# Patient Record
Sex: FEMALE | Race: WHITE
[De-identification: names, ages, dates, MRNs, and addresses within clinical notes are randomized per-mention and may not be internally consistent; named-entity substitution may affect disease eponyms.]

---

## 2019-09-22 ENCOUNTER — HOSPITAL ENCOUNTER (INPATIENT)
Dept: HOSPITAL 41 - JD.OBCHECK | Age: 34
LOS: 3 days | Discharge: HOME | DRG: 540 | End: 2019-09-25
Attending: OBSTETRICS & GYNECOLOGY | Admitting: OBSTETRICS & GYNECOLOGY
Payer: COMMERCIAL

## 2019-09-22 DIAGNOSIS — E66.9: ICD-10-CM

## 2019-09-22 DIAGNOSIS — Z79.899: ICD-10-CM

## 2019-09-22 DIAGNOSIS — Z88.0: ICD-10-CM

## 2019-09-22 DIAGNOSIS — Z3A.39: ICD-10-CM

## 2019-09-22 DIAGNOSIS — Z88.1: ICD-10-CM

## 2019-09-22 DIAGNOSIS — Z88.2: ICD-10-CM

## 2019-09-22 DIAGNOSIS — O34.211: ICD-10-CM

## 2019-09-22 PROCEDURE — 10907ZC DRAINAGE OF AMNIOTIC FLUID, THERAPEUTIC FROM PRODUCTS OF CONCEPTION, VIA NATURAL OR ARTIFICIAL OPENING: ICD-10-PCS | Performed by: OBSTETRICS & GYNECOLOGY

## 2019-09-22 NOTE — PCM.POSTAN
POST ANESTHESIA ASSESSMENT





- MENTAL STATUS


Mental Status: Alert, Oriented





- VITAL SIGNS


Vital Signs: 


 Last Vital Signs











2335


93/67


100


17


98%


97.9F


  


 


  


 


  


 


  


 


  














- RESPIRATORY


Respiratory Status: Respiratory Rate WNL, Airway Patent, O2 Saturation Stable, 

Supplemental Oxygen





- CARDIOVASCULAR


CV Status: Pulse Rate WNL, Blood Pressure Stable





- GASTROINTESTINAL


GI Status: No Symptoms





- PAIN


Pain Score: 0





- POST OP HYDRATION


Hydration Status: Adequate & Stable

## 2019-09-22 NOTE — PCM.PREANE
Preanesthetic Assessment





- Anesthesia/Transfusion/Family Hx


Anesthesia History: Prior Anesthesia Without Reaction


Family History of Anesthesia Reaction: No


Transfusion History: No Prior Transfusion(s)





- Review of Systems


General: No Symptoms


Pulmonary: No Symptoms (History of Asthma, does not use inhaler for control. )


Cardiovascular: No Symptoms


Gastrointestinal: No Symptoms


Neurological: No Symptoms


Other: Reports: Depression, Anxiety





- Physical Assessment


NPO Status Date: 19


NPO Status Time: 15:00


Vital Signs: 





 Last Vital Signs











Temp  36.6 C   19 20:10


 


Pulse  80   19 20:10


 


Resp  16   19 20:10


 


BP  133/85   19 20:10


 


Pulse Ox  99   19 20:10











Height: 1.55 m


Weight: 112.037 kg


ASA Class: 2E


Mental Status: Alert & Oriented x3


Airway Class: Mallampati = 2


Dentition: Reports: Normal Dentition


Thyro-Mental Finger Breadths: 3


Mouth Opening Finger Breadths: 3


ROM/Head Extension: Full


Lungs: Clear to Auscultation, Normal Respiratory Effort


Cardiovascular: Regular Rate, Regular Rhythm





- Lab


Values: 





 Laboratory Last Values











WBC  9.61 K/mm3 (3.98-10.04)   19  21:20    


 


RBC  4.40 M/mm3 (3.98-5.22)   19  21:20    


 


Hgb  13.2 gm/dl (11.2-15.7)   19  21:20    


 


Hct  39.9 % (34.1-44.9)   19  21:20    


 


MCV  90.7 fl (79.4-94.8)   19  21:20    


 


MCH  30.0 pg (25.6-32.2)   19  21:20    


 


MCHC  33.1 g/dl (32.2-35.5)   19  21:20    


 


RDW Std Deviation  45.7 fL (36.4-46.3)   19  21:20    


 


Plt Count  158 K/mm3 (182-369)  L  19  21:20    


 


MPV  10.8 fl (9.4-12.3)   19  21:20    


 


Neut % (Auto)  74.0 % (34.0-71.1)  H  19  21:20    


 


Lymph % (Auto)  16.5 % (19.3-51.7)  L  19  21:20    


 


Mono % (Auto)  8.3 % (4.7-12.5)   19  21:20    


 


Eos % (Auto)  0.4  (0.7-5.8)  L  19  21:20    


 


Baso % (Auto)  0.2 % (0.1-1.2)   19  21:20    


 


Neut # (Auto)  7.10 K/mm3 (1.56-6.13)  H  19  21:20    


 


Lymph # (Auto)  1.59 K/mm3 (1.18-3.74)   19  21:20    


 


Mono # (Auto)  0.80 K/mm3 (0.24-0.36)  H  19  21:20    


 


Eos # (Auto)  0.04 K/mm3 (0.04-0.36)   19  21:20    


 


Baso # (Auto)  0.02 K/mm3 (0.01-0.08)   19  21:20    


 


RPR  Non-reactive  (NONREACTIVE)   19  21:20    


 


Blood Type  O POSITIVE   19  21:20    


 


Gel Antibody Screen  Negative   19  21:20    














- Allergies


Allergies/Adverse Reactions: 


 Allergies











Allergy/AdvReac Type Severity Reaction Status Date / Time


 


Penicillins Allergy  Hives Verified 16 23:30


 


Sulfa (Sulfonamide Allergy  Hives Verified 16 23:30





Antibiotics)     


 


vancomycin Allergy  Itching Verified 16 03:45














- Acknowledgements


Anesthesia Type Planned: Spinal


Pt an Appropriate Candidate for the Planned Anesthesia: Yes


Alternatives and Risks of Anesthesia Discussed w Pt/Guardian: Yes


Pt/Guardian Understands and Agrees with Anesthesia Plan: Yes





PreAnesthesia Questionnaire


Respiratory History: Reports: Asthma


OB/GYN History: Reports: Pregnancy


Other OB/BYN History: Previous  


Endocrine/Metabolic History: Reports: Obesity/BMI 30+


Other Endocrine/Metabolic History: has had thyroid issues since after last 

pregnancy, but her levels have been fine with this pregnancy





- Past Surgical History


HEENT Surgical History: Reports: Oral Surgery, Tonsillectomy, Other (See Below)


Female  Surgical History: Reports:  Section





- HOME MEDS


Home Medications: 


 Home Meds





Acetaminophen [Tylenol] 650 mg PO Q6H PRN #0 tablet 16 [Rx]


Benzocaine/Menthol [Dermoplast Pain Relief Spray] 1 spray TOP ASDIRECTED PRN #0 

canister 16 [Rx]


Docusate Sodium [Colace] 100 mg PO BID PRN #0 cap 16 [Rx]


Ibuprofen [IJD: Ibuprofen] 200 - 600 mg PO Q6H PRN #0 tablet 16 [Rx]


Prenatal Vit with Ca/FA/Iron [Prenatal Plus Iron] 1 each PO DAILY  tablet  [Rx]


Witch Hazel [Tucks] 1 pad TOP ASDIRECTED PRN #0 pad 16 [Rx]











- CURRENT (IN HOUSE) MEDS


Current Meds: 





 Current Medications





Diphenhydramine HCl (Benadryl)  25 mg IVPUSH Q6H PRN


   PRN Reason: Pruritis


Ephedrine Sulfate (Ephedrine Sulfate)  5 mg IVPUSH ASDIRECTED PRN


   PRN Reason: Hypotension


Fentanyl (Sublimaze)  50 mcg IVPUSH Q5M PRN


   PRN Reason: Pain


Lactated Ringer's (Ringers, Lactated)  1,000 mls @ 100 mls/hr IV ASDIRECTED [facility]


   Last Admin: 19 22:17 Dose:  100 mls/hr


Clindamycin Phosphate 900 mg/ (Sodium Chloride)  106 mls @ 100 mls/hr IV Q8H [facility]


   Last Admin: 19 21:35 Dose:  100 mls/hr


Oxytocin/Lactated Ringer's (Pitocin In Lr 10 Units/1,000 Ml)  10 unit in 1,000 

mls @ 500 mls/hr IV .CONTINUOUS [facility]


Oxytocin/Lactated Ringer's (Pitocin In Lr 10 Units/1,000 Ml)  10 unit in 1,000 

mls @ 100 mls/hr IV ASDIRECTED [facility]


Nalbuphine HCl (Nubain)  10 mg IVPUSH Q2H PRN


   PRN Reason: Pain


Ondansetron HCl (Zofran)  4 mg IVPUSH Q4H PRN


   PRN Reason: Nausea/Vomiting


Ondansetron HCl (Zofran)  4 mg IVPUSH ONETIME PRN


   PRN Reason: Nausea/Vomiting


Sodium Chloride (Saline Flush)  10 ml FLUSH ASDIRECTED PRN


   PRN Reason: Keep Vein Open





Discontinued Medications





Bupivacaine HCl (Marcaine 0.5%) Confirm Administered Dose 30 ml .ROUTE .STK-MED 

ONE


   Stop: 19 22:32


Cefazolin Sodium (Ancef) Confirm Administered Dose 2 gm .ROUTE .STK-MED ONE


   Stop: 19 22:29


Citric Acid/Sodium Citrate (Bicitra Solution)  30 ml PO ONETIME ONE


   Stop: 19 22:26


Sodium Chloride (Normal Saline)  1,000 mls @ 999 mls/hr IV ONETIME ONE


   Stop: 19 22:21


   Last Admin: 19 21:30 Dose:  999 mls/hr


Cefazolin Sodium/Dextrose 2 gm (/ Premix)  50 mls @ 100 mls/hr IV ONETIME ONE


   Stop: 19 22:54


Lactated Ringer's (Ringers, Lactated) Confirm Administered Dose 2,000 mls @ as 

directed .ROUTE .STK-MED ONE


   Stop: 19 22:29


Ketorolac Tromethamine (Toradol) Confirm Administered Dose 30 mg .ROUTE .STK-

MED ONE


   Stop: 19 22:29


Metoclopramide HCl (Reglan)  10 mg IVPUSH ONETIME ONE


   Stop: 19 22:26


Morphine Sulfate (Duramorph Pf) Confirm Administered Dose 10 mg .ROUTE .STK-MED 

ONE


   Stop: 19 22:29


Ondansetron HCl (Zofran) Confirm Administered Dose 4 mg .ROUTE .STK-MED ONE


   Stop: 19 22:29


Oxytocin (Pitocin) Confirm Administered Dose 20 unit .ROUTE .STK-MED ONE


   Stop: 19 22:29


Phenylephrine HCl (Phenylephrine In Ns 100 Mcg/Ml) Confirm Administered Dose 1 

mg .ROUTE .STK-MED ONE


   Stop: 19 23:18

## 2019-09-22 NOTE — PCM.SN
- Free Text/Narrative


Note: 





addendum to previously dictated history and physical.





Esperanza is a 34-year-old  3 para 1102 white female who is admitted on the 

evening of 2019 at 39-5/7 weeks gestational age with an JOSE of 2019. 

Her chief concern at this time is decreased fetal activity. She has a history 

of previous  section and would like to do a trial of labor after 

 section to effect a vaginal birth after  section. She has 

successful VBACed with her most recent pregnancy. The patient noted decreased 

fetal activity today and came in for that reason alone. She has occasional 

contractions but they're not strong. She denies any leakage of fluid, bleeding 

or other signs of labor.





Fetal heart tones initially are in the 160s and 170s with decreased 

variability. Decision was made to admit the patient and to proceed with labor. 

Artificial rupture is undertaken with resultant meconium-stained amniotic 

fluid. Her cervix is 3 cm dilated, 80% effaced, very soft, midposition and at a 

-3 station, cephalic presentation. Attempt was made to place a scalp electrode 

but this is on successful.





Remainder of the history and physical is essentiallyunchanged from that 

dictated previously and in this chart.





Assessment:





1. Term intrauterine pregnancy at 39-3/7 weeks gestational age admitted for 

reported decreased fetal activity. have contractions every 3-5 minutes apart, 

mild lens intensity. Cervix is changed to 3 cm and 80% effaced. Fetal heart 

tones are with decreased variability and are in the 160s 70s range. skin great 

2 fetal heart rate tracing will be monitored closely with intervention if 

appropriate.  attempt is discussed in detail with patient including 

procedure, risks, benefits, efforts to reduce potential risk. she appears to 

understand, wishes to proceed.





2. risk factors for the pregnancy include increased weight, distance from the 

hospital and history of previous  section. para 3. blood is o+.





4. rubella titer shows immunity





5. patient plans to breast-feed.





plan:





1. onitor contractions and fetal heart tones very closely.





2. pediatrician be in attendance at time of delivery because of meconium-

stained amniotic fluid





3. repeat  section if fetal heart tones indicate baby will not 

withstand labor necessary to effect delivery.





4. consents for trial of labor after  section for vaginal birth after 

 section processes (TOLAC for ) and for repeat  section





5. we'll obtain preoperative  labs





6. near continuous fetal heart rate and contraction monitoring monitoring





7. will alert anesthesia that patient is in-house.





8. support breast-feeding intention





9. routine labor care

## 2019-09-22 NOTE — PCM.SN
- Free Text/Narrative


Note: 





patient has received 1 L of normal saline to rehydrate her and see whether a 

change in fetal heart tones noted. She continues to have a fetal heart rate of 

165-170 with minimal variability. Occasional very mild late decelerations are 

noted. Patient's cervix is 3 cm, 80% effaced, -3 station, mid position, very 

soft, cephalic presentation. Considering the amount of labor necessary to  

achieve vaginal delivery presence of meconium-stained amniotic fluid,  fetal 

heart tones as described in a patient who has had a previous  section 

feel that  is warranted. The procedure, risks, benefits, follow-up 

were discussed in detail with patient. She appears to understand and wishes to 

proceed. She has signed a consent.  We'll proceed to  section. 

pediatrician will be in attendance.

## 2019-09-22 NOTE — PCM.OPNOTE
- General Post-Op/Procedure Note


Date of Surgery/Procedure: 19


Operative Procedure(s): repeat lower uterine segment transverse  

section through Jerman skin incision under spinal block


Findings: 





normaluterus, tubes and ovaries consistent with term pregnancy. Baby in vertex 

presentation. Meconium-stained amniotic fluid. Moderatedelivery scarring in the 

anterior abdominal wall.


Pre Op Diagnosis: 1. 39-3/7 week intrauterine pregnancy.  2. Nonreassuring 

fetal heart tones.  3. Meconium-stained amniotic fluid.  4. previous  

affecting pregnancy


Post-Op Diagnosis: Same with nuchal cord 2 and a true knot in the umbilical 

cord.  male infant,  3520 g (7 lbs. 12 oz.) Apgars of  0 and 7 at one and 5 

minutes post delivery..Delivery time 3303 hours on 2019


Anesthesia Technique: Spinal


Other Anesthesia Type: Marcaine 0.5%10 mL  local


Primary Surgeon: Adama Reza


Secondary Surgeon: Edgarod Lynch


Anesthesia Provider: Kenyatta Anthony


Reason Assistant Was Necessary: 





Retraction, assistance, patient safety, quality of care.


Fluid Replacement, Intraop: 2,000


Output, Urine Amount: 100


EBL in mLs: 500


Complications: None


Condition: Good


Free Text/Narrative:: 





surgery duration: 51 minutes





Procedure:  The patient is appropriately consented. Patient was transferred to 

the  room and placed in a sitting position. Spinal anesthesia was 

administered. After confirmation of adequate anesthesia patient was placed in a 

supine position with a wedge under her right side to facilitate left lateral 

positioning. The patient was prepped and draped in usual fashion after Aguilar 

catheter was placed . The anesthetic was checked and found to be adequate. 20 

mL of Marcaine 0.5% was injected locally in the Pfannenstiel incision site. The 

Pfannenstiel skin incision was then made and carried down through skin, 

subcutaneous and fascial layers. The fascia was then undermined superiorly and 

inferiorly to allow for adequate operating room. The recti muscles  

midline and preperitoneal fat was bluntly dissected. Peritoneal cavity was 

entered longitudinally. The vesicouterine peritoneum was then incised 

transversely and bladder flap was developed. Myometrium was incised 

transversely to the level of the amniotic sac. This incision was extended 

bilaterally in a blunt fashion. The amniotic sac was then ruptured resulting in 

meconium-stained amniotic fluid. A hand is placed in the low uterine segment 

and the baby's head was brought forth through the incision. The baby was 

completely delivered using fundal pressure in a routine fashion. The nose and 

mouth were bulb suctioned. Baby's cord was clamped x2 cut and baby was handed 

off to attending pediatrician Dr owusu. Placenta was expressed after cord blood 

was obtained. Uterus was then exteriorized to allow for easier closure. The 

cervix was assessed and found to be dilated adequately to allow egress of 

blood. The uterus was closed in 2 layers. The first layer a running locked 

suture of 0 Monocryl, the second layer a running locked vertical mattress 

suture of 0 Monocryl. Figure-of-eight suture was placed at mid incision to 

control 1 bleeder. Hemostasis confirmed at this time. Sponge instrument needle 

counts are correct. The uterus was returned to the abdominal cavity and lateral 

gutters were cleared of blood. Once again sponge needle counts are correct. The 

anterior abdominal wall was closed with a #1 PDS suture from angle to angle. 

The subcutaneous area was found to be free of any bleeders. interrupted sutures 

of 3-0 Monocryl were used to reapproximate the subcutaneous layer.Skin was 

closed with a running subcuticular stitch of 3-0 Monocryl in a vertical 

mattress suture fashion using a Carrillo needle. Prineo mesh/glue was then applied 

to further approximate the incision.





It should be noted that patient received 2 g of Ancef preoperatively for 

infection prophylaxis and had Pitocin infused after delivery of the placenta to 

facilitate uterine contraction. She also had sequential compression stockings 

in place for DVT prophylaxis. Patient was discharged from the operating room in 

satisfactory condition.

## 2019-09-22 NOTE — PCM.SN
- Free Text/Narrative


Note: 





Admission H and P:








19 10:58


Esperanza is a 34-year-old  3 para / white female who is scheduled for 

admission on 2019 for elective induction of labor at 39-5/7 weeks 

gestational age with an JOSE of 2019. Patient has had a previous  

section but has successfully VBACed with her most recent pregnancy.


Source of Information: Patient


History Limitations: Reports: No Limitations





- History of Present Illness


Introduction:: 





Esperanza is a 34-year-old  3 para / white female who is scheduled for 

admission on 2019 for elective induction of labor at 39-5/7 weeks 

gestational age with an JOSE of 2019. Patient has had a previous  

section but has successfully VBACed with her most recent pregnancy.Procedure of 

trial labor after  section, its risks, benefits and efforts intended to 

reduce risk at the time of the labor are discussed in detail. She appears to 

understand, wishes to proceed. Upon admission patient will be started on 

induction protocol, consents will be obtained for trial labor after  

section and attempt at vaginal birth after  section,  labs 

will be obtained, fetal heart tones will be near continuously monitored, 

anesthesia and surgery departments will be made aware of her presence in labor 

and delivery.





OB/GYN history: Esperanza is a  3 para 1102 with an JOSE of 2019 as 

determined by certain last menstrual period starting 2018 and supported 

by 2 ultrasounds done on 3/14/2019 and 2019. Patient's prenatal course has 

been relatively unremarkable. Her first prenatal visit was on 3/14/2019. She's 

been seen on a regular basis. Her weight loss has been from 265 pounds to 248 

pounds.. Patient attributes this to eating properly. Her fundal height growth 

has been appropriate. Patient is group B strep positive but her group B strep 

has been evaluated for sensitivity to clindamycin which has been confirmed. 

Patient has had some anxiety during the pregnancy. She has a history of asthma. 

Depression. She desires epidural in labor. Her [place] postpartum depression 

screening score on 2019 was 3/30. She lives in [facility]. He plans 

to  throughout the whole pregnancy. She plans to breast-feed. Quad screen 

was negative. Patient did have a shingles outbreak during the course of the 

pregnancy. Risk factors include the following: Distance from the hospital, 

history of  section and increased weight.


Past obstetric history includes the followin. Primary  section 2012 at 36 weeks gestational age after 19 

hours of labor. 6 lbs. 9 oz. female born in [facility]. Patient was 

noted to have fetal distress child's name is Yasmany





2. Vaginal birth after  section on 201638-6/7 week intrauterine 

[id number] hours of labor7 lbs. 13 oz.[place]





Prenatal laboratory testing shows blood to be O+ with a negative and by screen. 

First prenatal hemoglobin was 13.1 g/dL. Platelets are 191,000. Rubella titer 

showed immunity. RPR is nonreactive. FSB surface antigen and HIV assays were 

both negative as were Chlamydia and gonorrhea assays. Second trimester labs 

showed hemoglobin 12.4 g/dL. Platelets are 165,000. Her diabetic screening test 

was elevated at 157. Her three-hour glucose tolerance test was normal with 

fasting blood sugar of 89 a 1 hour glucose of 165. 2R glucose was 154 and 3 

hour glucose was 81. Third trimester platelet count was 152,000 on 2019. 

Her hemoglobin was 12.8 at that time. Group B strep screen was positive. 

Patient has an allergy to ampicillin penicillin however group B strep was found 

to be sensitive to clindamycin and vancomycin.





Allergies:





1. Penicillins which cause hives





2. Sulfur drugs which cause hives 





3. Vancomycin which causes itching.





Past medical history:





1. Vaginal delivery 1





2. Anxiety/depression. Has been on medication postpartum for this





3. Obesity





4. Acne





5. History of abnormal Pap smears in 





Past surgical history:





1.  section 2012 for fetal distress.





Family history: Father of the baby and his sister have factor V Leiden 

mutation. Patient's mother and father are alive and well. Mother has 

hypertension. Father with posttraumatic stress disorder. Patient has half-

sisters and half brothers who are alive and well. Maternal grandmother is 

 secondary to liver disorder. Maternal grandfather  secondary 

to prostate cancer. Paternal grandmother  secondary some type of 

cancer. Paternal grandfather with history of Alzheimer's-. No bleeding, 

blood clotting, anesthesia or pregnancy problems noted in the family.





Social history: Patient is . She lives in [facility]. She is a 

dental assistant. She is a college graduate. She does not use any significant 

muscle L call, drugs or tobacco. Her 's name is Dominick Avina.





Review of systems: In general patient has no complaints. Babies have been 

active. No contractions or other concerns.





Skin: Negative





Lungs: No infectious symptoms or shortness of breath





Cardiovascular: No chest pain or exercise intolerance





Breasts: No lumps, changes in size, pain, dimpling, discharge or axillary or 

supraclavicular concerns.





GI: Negative





: Negative





Musculoskeletal: Negative





Neurological: Negative





In general the patient is well-developed, well-nourished, pleasant female of 

stated age in no acute distress.





Patient's pregravid weight was 263 pounds. Weight on last evaluation clinic  was 248.8 pounds. Height is 5 feet 2. On last evaluation clinic was 118/

82.





Skin is warm dry without lesions.





HEENT, neck and back within normal limits.





Lungs are clear with good breath sounds in all lung fields.





Cardiovascular exam shows regular and rhythm without murmurs.





Breasts exam done at first prenatal visit was found to be normal. Is not 

repeated at this time. Patient plans to breast-feed.





Abdomen is gravid with last fundal height of 40 cm. Baby in vertex presentation 





Genital digital exam shows cervix to be 2 cm, 30% effaced, very soft, mid 

position, -3 station. 





Extremities and neurological exam are grossly within normal limits.  





- Related Data


Allergies/Adverse Reactions: 


 Allergies











Allergy/AdvReac Type Severity Reaction Status Date / Time


 


Penicillins Allergy  Hives Verified 16 23:30


 


Sulfa (Sulfonamide Allergy  Hives Verified 16 23:30





Antibiotics)     


 


vancomycin Allergy  Itching Verified 16 03:45











Home Medications: 


 Home Meds





Acetaminophen [Tylenol] 650 mg PO Q6H PRN #0 tablet 16 [Rx]


Benzocaine/Menthol [Dermoplast Pain Relief Spray] 1 spray TOP ASDIRECTED PRN #0 

canister 16 [Rx]


Docusate Sodium [Colace] 100 mg PO BID PRN #0 cap 16 [Rx]


Ibuprofen [IJD: Ibuprofen] 200 - 600 mg PO Q6H PRN #0 tablet 16 [Rx]


Prenatal Vit with Ca/FA/Iron [Prenatal Plus Iron] 1 each PO DAILY  tablet  [Rx]


Witch Hazel [Tucks] 1 pad TOP ASDIRECTED PRN #0 pad 16 [Rx]











Past Medical History


Respiratory History: Reports: Asthma


OB/GYN History: Reports: Pregnancy


Other OB/BYN History: Previous  


Endocrine/Metabolic History: Reports: Obesity/BMI 30+


Other Endocrine/Metabolic History: has had thyroid issues since after last 

pregnancy, but her levels have been fine with this pregnancy





- Past Surgical History


HEENT Surgical History: Reports: Oral Surgery, Tonsillectomy, Other (See Below)


Female  Surgical History: Reports:  Section





Social & Family History





- Family History


Family Medical History: Noncontributory





H&P Review of Systems





- Review of Systems:


Review Of Systems: See Below





L&D Exam





- Exam


Exam: See Below


Problem List Initiated/Reviewed/Updated: Yes


Assessment/Plan Comment:: 





1. Term intrauterine pregnancy at 39-5/7 weeks gestational age admitted for 

elective induction of labor for trial labor after  section for attempt 

at vaginal birth after  section. Procedure, risks, benefits, efforts to 

reduce potential risk of a  are all discussed with the patient. She appears 

to understand, wishes to proceed.





2. Risk factors for the pregnancy include the following: Increased distance 

from the hospital, increased, history of previous  section.





3. Blood is O+.





4. Rubella titer shows immunity





5. Patient plans to breast-feed





Plan:





1.Induction of labor with Pitocin/artificial rupture of membranes





2. Obtain  labs upon admission along with an RPR.





3. Near continuous fetal heart rate monitoring





4. IV access





5. Alert surgery and anesthesia department's that patient is labor





6. Patient signed consent for a trial of labor after  section for 

attempt at vaginal birth after  section





7. Routine labor care.





8. Support breast-feeding intention.

## 2019-09-23 RX ADMIN — VITAMIN A, ASCORBIC ACID, CHOLECALCIFEROL, .ALPHA.-TOCOPHEROL ACETATE, DL-, THIAMINE MONONITRATE, RIBOFLAVIN, NIACINAMIDE, PYRIDOXINE HYDROCHLORIDE, FOLIC ACID, CYANOCOBALAMIN, CALCIUM CARBONATE, IRON, ZINC OXIDE, AND CUPRIC OXIDE SCH EACH: 4000; 120; 400; 22; 1.84; 3; 20; 10; 1; 12; 200; 29; 25; 2 TABLET ORAL at 10:07

## 2019-09-24 RX ADMIN — VITAMIN A, ASCORBIC ACID, CHOLECALCIFEROL, .ALPHA.-TOCOPHEROL ACETATE, DL-, THIAMINE MONONITRATE, RIBOFLAVIN, NIACINAMIDE, PYRIDOXINE HYDROCHLORIDE, FOLIC ACID, CYANOCOBALAMIN, CALCIUM CARBONATE, IRON, ZINC OXIDE, AND CUPRIC OXIDE SCH EACH: 4000; 120; 400; 22; 1.84; 3; 20; 10; 1; 12; 200; 29; 25; 2 TABLET ORAL at 09:50

## 2019-09-24 NOTE — PCM.SN
- Free Text/Narrative


Note: 





Postoperative day #2:








Patient is doing well in the postpartum period. Minimal lochia, voiding well, 

ambulated without problems. Nursing without concerns.





Patient is afebrile, vital signs are stable





Abdomen is flat, soft, uterus is below the umbilicus and is firm and nontender. 

Incision is dry and intact, mesh is in place. Sutures still intact





Legs are nontender.





Assessment: Postop day #2?proximally 36 hours post surgery-recovery going well.





Plan: Routine postpartum care. Patient be discharged home within the next 24-48 

hours.

## 2019-09-25 VITALS — HEART RATE: 84 BPM | DIASTOLIC BLOOD PRESSURE: 66 MMHG | SYSTOLIC BLOOD PRESSURE: 119 MMHG

## 2019-09-25 NOTE — [id number]
**Discharge Summary





- Hospital Course


Free Text/Narrative:: 








Esperanza is a 34-year-old  3 now para 3003 white female was admitted on  with reports of decreased fetal activity. Should she was noted to have 

fetal heart tones running in the 160s to 170s range with decreased variability. 

Attempts were made to hydrate the patient with IV fluids, repositioned the 

patient but fetal heart tones did not improve and decision was made to take the 

patient for a repeat  section. She had previous  section with 

her first pregnancy but had successfully  with her second pregnancy. She 

was hoping for a  with this delivery also. Patient was taken to surgery and 

a viable, jackson, male infant with weight of 3520 g (7 lbs. 12 oz.) was 

delivered. Apgars were 0 and 7 at one and 5 minutes respectively. Delivery time 

was 3303 hours  on 2019. Baby was noted to have nuchal cord 2 which was 

moderately tight and H were not in the cord. Amniotic fluid was moderately 

meconium stained.





Postpartum patient is done well. She is recovering nicely. She is made good 

bowel, ladder and ambulatory recovery. She is nursing. Baby was on IV fluids 

for short period time and is now doing well. Desiring discharge home. Patient 

is well-appearing well. Vital signs are stable and she's been afebrile. Her CBC 

after surgery was within reasonable limits considering pregnancy and surgical 

procedure of  section.





Diagnosis: Stroke: No





- Discharge Data


Discharge Date: 19


Discharge Disposition: Home, Self-Care 01


Condition: Good





- Referral to Home Health


Primary Care Physician: 


Adama Reza MD








- Patient Summary/Data


Operative Procedure(s) Performed: repeat lower uterine segment transverse 

 section through Jerman skin incision under spinal block





- Patient Instructions


Diet: Regular Diet as Tolerated (Nursing diet with increase calories and 

calcium is recommended)


Activity: As Tolerated (No intercourse or tampons until bleeding resolves. No 

lifting greater than 15 pounds or driving a car 1 week.)


Driving: Do Not Drive


Showering/Bathing: May Shower


Wound/Incision Care: Keep Operative Site/Wound Site Clean and Dry


Notify Provider of: Fever, Increased Pain, Swelling and Redness, Drainage, 

Nausea and/or Vomiting





- Discharge Plan


Home Medications: 


 Home Meds





Loratadine/Pseudoephedrine [Claritin-D 24 Hour Tablet] 1 tab PO DAILY 19 [

History]


Montelukast [Singulair] 10 mg PO DAILY 19 [History]


PNV95/Ferrous Fumarate/FA [Prenatal Vitamin Tablet] 1 tab PO DAILY 19 [

History]


Acetaminophen [Tylenol] 650 mg PO Q6HR PRN  tablet 19 [Rx]


Ibuprofen [Motrin] 600 mg PO Q6H PRN  tablet 19 [Rx]








Referrals: 


Adama Reza MD [Primary Care Provider] -  (Return to [facility] 

weeks.)





- Discharge Summary/Plan Comment


DC Time >30 min.: No


Discharge Summary/Plan Comment: 








Discharge instructions:





1. Discharge home





2. Diet, activity and follow-up discussed with patient. Recommend nursing diet 

with increased calories and calcium.





3. Precautions given concern increased pain, bleeding, temperature, signs/

symptoms of DVT/PE.





4. Medications per home medication was printed, discussed with and given to the 

patient.





5. Return to clinic-Dr. Reza[facility] in 2 weeks.





Diagnosis: 





1. Term pregnancy-delivered by repeat  section





Condition: Good





- Patient Data


Vitals - Most Recent: 


 Last Vital Signs











Temp  36.7 C   19 02:46


 


Pulse  72   19 02:46


 


Resp  14   19 02:46


 


BP  128/87   19 02:46


 


Pulse Ox  98   19 02:46











Weight - Most Recent: 112.037 kg


I&O - Last 24 hours: 


 Intake & Output











 19





 14:59 22:59 06:59


 


Intake Total 420 0 


 


Balance 420 0 











Med Orders - Current: 


 Current Medications





Acetaminophen (Tylenol)  650 mg PO Q6HR PRN


   PRN Reason: Pain (moderate 4-6)


   Last Admin: 19 02:43 Dose:  650 mg


Diphenhydramine HCl (Benadryl)  25 mg IVPUSH Q6H PRN


   PRN Reason: Itching or Nausea


Docusate Sodium (Colace)  100 mg PO Q12H PRN


   PRN Reason: Constipation


   Last Admin: 19 05:13 Dose:  100 mg


Emollient Ointment (Lansinoh Hpa)  0 gm TOP ASDIRECTED PRN


   PRN Reason: Sore Nipples


Ephedrine Sulfate (Ephedrine Sulfate)  5 mg IVPUSH SEECOMMENT PRN


   PRN Reason: Other


Ibuprofen (Motrin)  600 mg PO Q6H PRN


   PRN Reason: Pain (moderate 4-6)


   Last Admin: 19 05:13 Dose:  600 mg


Montelukast Sodium (Singulair)  10 mg PO BEDTIME [facility]


   Last Admin: 19 23:14 Dose:  Not Given


Naloxone HCl (Narcan)  0.1 mg IVPUSH SEECOMMENT PRN


   PRN Reason: Respiratory Depression


Ondansetron HCl (Zofran)  4 mg IV Q4H PRN


   PRN Reason: Nausea/Vomiting


   Last Admin: 19 06:46 Dose:  4 mg


Oxycodone/Acetaminophen (Percocet 325-5 Mg)  2 tab PO Q4H PRN


   PRN Reason: Pain (moderate 4-6)


Prenat Multivit/Marshall/Iron/Folic Ac (Prenatal Plus Iron)  1 each PO DAILY [facility]


   Last Admin: 19 09:50 Dose:  1 each


Simethicone (Simethicone)  160 mg PO QID [facility]


   Last Admin: 19 20:36 Dose:  160 mg





Discontinued Medications





Bupivacaine HCl (Marcaine 0.5%) Confirm Administered Dose 30 ml .ROUTE .STK-MED 

ONE


   Stop: 19 22:32


   Last Admin: 19 22:59 Dose:  20 ml


Cefazolin Sodium (Ancef) Confirm Administered Dose 2 gm .ROUTE .STK-MED ONE


   Stop: 19 22:29


Citric Acid/Sodium Citrate (Bicitra Solution)  30 ml PO ONETIME ONE


   Stop: 19 22:26


   Last Admin: 19 22:30 Dose:  30 ml


Diphenhydramine HCl (Benadryl)  25 mg IVPUSH Q6H PRN


   PRN Reason: Pruritis


Ephedrine Sulfate (Ephedrine Sulfate)  5 mg IVPUSH ASDIRECTED PRN


   PRN Reason: Hypotension


Fentanyl (Sublimaze)  50 mcg IVPUSH Q5M PRN


   PRN Reason: Pain


Lactated Ringer's (Ringers, Lactated)  1,000 mls @ 100 mls/hr IV ASDIRECTED FORTUNATO


   Last Admin: 19 22:17 Dose:  100 mls/hr


Clindamycin Phosphate 900 mg/ (Sodium Chloride)  106 mls @ 100 mls/hr IV Q8H [facility]


   Last Admin: 19 21:35 Dose:  100 mls/hr


Oxytocin/Lactated Ringer's (Pitocin In Lr 10 Units/1,000 Ml)  10 unit in 1,000 

mls @ 500 mls/hr IV .CONTINUOUS [facility]


Sodium Chloride (Normal Saline)  1,000 mls @ 999 mls/hr IV ONETIME ONE


   Stop: 19 22:21


   Last Admin: 19 21:30 Dose:  999 mls/hr


Cefazolin Sodium/Dextrose 2 gm (/ Premix)  50 mls @ 100 mls/hr IV ONETIME ONE


   Stop: 19 22:54


   Last Admin: 19 23:46 Dose:  Not Given


Oxytocin/Lactated Ringer's (Pitocin In Lr 10 Units/1,000 Ml)  10 unit in 1,000 

mls @ 100 mls/hr IV ASDIRECTED [facility]


Lactated Ringer's (Ringers, Lactated) Confirm Administered Dose 2,000 mls @ as 

directed .ROUTE .STK-MED ONE


   Stop: 19 22:29


Dextrose/Lactated Ringer's (Dextrose 5%-Lactated Ringers)  1,000 mls @ 125 mls/

hr IV ASDIRECTED [facility]


   Stop: 19 08:50


   Last Admin: 19 02:24 Dose:  125 mls/hr


Ibuprofen (Motrin)  800 mg PO Q8H [facility]


Ibuprofen (Motrin)  800 mg PO Q8H [facility]


   Last Admin: 19 06:36 Dose:  800 mg


Ibuprofen (Motrin)  800 mg PO Q8H [facility]


   Last Admin: 19 13:19 Dose:  800 mg


Ketorolac Tromethamine (Toradol) Confirm Administered Dose 30 mg .ROUTE .STK-

MED ONE


   Stop: 19 22:29


Meperidine HCl (Meperidine)  12.5 mg IVPUSH ONETIME ONE


   Stop: 19 00:35


   Last Admin: 19 02:26 Dose:  12.5 mg


Metoclopramide HCl (Reglan)  10 mg IVPUSH ONETIME ONE


   Stop: 19 22:26


   Last Admin: 19 22:30 Dose:  10 mg


Montelukast Sodium (Singulair)  10 mg PO BEDTIME FORTUNATO


Morphine Sulfate (Duramorph Pf) Confirm Administered Dose 10 mg .ROUTE .STK-MED 

ONE


   Stop: 19 22:29


Nalbuphine HCl (Nubain)  10 mg IVPUSH Q2H PRN


   PRN Reason: Pain


Ondansetron HCl (Zofran)  4 mg IVPUSH Q4H PRN


   PRN Reason: Nausea/Vomiting


Ondansetron HCl (Zofran) Confirm Administered Dose 4 mg .ROUTE .STK-MED ONE


   Stop: 19 22:29


Ondansetron HCl (Zofran)  4 mg IVPUSH ONETIME PRN


   PRN Reason: Nausea/Vomiting


Oxytocin (Pitocin) Confirm Administered Dose 20 unit .ROUTE .STK-MED ONE


   Stop: 19 22:29


Phenylephrine HCl (Phenylephrine In Ns 100 Mcg/Ml) Confirm Administered Dose 1 

mg .ROUTE .STK-MED ONE


   Stop: 19 23:18


Sodium Chloride (Saline Flush)  10 ml FLUSH ASDIRECTED PRN


   PRN Reason: Keep Vein Open